# Patient Record
Sex: MALE | URBAN - METROPOLITAN AREA
[De-identification: names, ages, dates, MRNs, and addresses within clinical notes are randomized per-mention and may not be internally consistent; named-entity substitution may affect disease eponyms.]

---

## 2021-04-01 ENCOUNTER — NURSE ONLY (OUTPATIENT)
Dept: PRIMARY CARE CLINIC | Age: 31
End: 2021-04-01

## 2021-04-01 DIAGNOSIS — Z23 HIGH PRIORITY FOR COVID-19 VIRUS VACCINATION: Primary | ICD-10-CM

## 2025-01-30 ENCOUNTER — HOSPITAL ENCOUNTER (EMERGENCY)
Age: 35
Discharge: HOME | End: 2025-01-30
Attending: STUDENT IN AN ORGANIZED HEALTH CARE EDUCATION/TRAINING PROGRAM
Payer: COMMERCIAL

## 2025-01-30 VITALS
BODY MASS INDEX: 28.03 KG/M2 | HEART RATE: 92 BPM | DIASTOLIC BLOOD PRESSURE: 82 MMHG | RESPIRATION RATE: 14 BRPM | HEIGHT: 75 IN | OXYGEN SATURATION: 99 % | TEMPERATURE: 97.9 F | SYSTOLIC BLOOD PRESSURE: 139 MMHG | WEIGHT: 225.4 LBS

## 2025-01-30 DIAGNOSIS — S61.412A LACERATION OF LEFT HAND WITHOUT FOREIGN BODY, INITIAL ENCOUNTER: Primary | ICD-10-CM

## 2025-01-30 DIAGNOSIS — S60.311A ABRASION OF RIGHT THUMB, INITIAL ENCOUNTER: ICD-10-CM

## 2025-01-30 PROCEDURE — 90471 IMMUNIZATION ADMIN: CPT | Performed by: STUDENT IN AN ORGANIZED HEALTH CARE EDUCATION/TRAINING PROGRAM

## 2025-01-30 PROCEDURE — 99284 EMERGENCY DEPT VISIT MOD MDM: CPT

## 2025-01-30 PROCEDURE — 90715 TDAP VACCINE 7 YRS/> IM: CPT | Performed by: STUDENT IN AN ORGANIZED HEALTH CARE EDUCATION/TRAINING PROGRAM

## 2025-01-30 PROCEDURE — 12002 RPR S/N/AX/GEN/TRNK2.6-7.5CM: CPT

## 2025-01-30 PROCEDURE — 6360000002 HC RX W HCPCS: Performed by: STUDENT IN AN ORGANIZED HEALTH CARE EDUCATION/TRAINING PROGRAM

## 2025-01-30 RX ORDER — LIDOCAINE HYDROCHLORIDE AND EPINEPHRINE 10; 10 MG/ML; UG/ML
20 INJECTION, SOLUTION INFILTRATION; PERINEURAL ONCE
Status: COMPLETED | OUTPATIENT
Start: 2025-01-30 | End: 2025-01-30

## 2025-01-30 RX ADMIN — TETANUS TOXOID, REDUCED DIPHTHERIA TOXOID AND ACELLULAR PERTUSSIS VACCINE, ADSORBED 0.5 ML: 5; 2.5; 8; 8; 2.5 SUSPENSION INTRAMUSCULAR at 21:39

## 2025-01-30 RX ADMIN — LIDOCAINE HYDROCHLORIDE,EPINEPHRINE BITARTRATE 20 ML: 10; .01 INJECTION, SOLUTION INFILTRATION; PERINEURAL at 21:15

## 2025-01-31 NOTE — ED PROVIDER NOTES
EMERGENCY DEPARTMENT PROVIDER NOTE         PATIENT IDENTIFICATION     Name:   Edd Mclain  MRN:   [id number]  YOB: 1990  Date of Evaluation:   1/30/2025  Provider:   Yaya Lara DO  PCP:   No primary care provider on file.        CHIEF COMPLAINT       Laceration        HISTORY OF PRESENT ILLNESS     Edd Mclain is a(n) 34 y.o. male with no stated past medical history who arrives via private vehicle for laceration to the bilateral hands.  States that about 20 minutes prior to arrival he was cut by the handle of a glass mug that has a sharp edge.  Denies that the glass mug shattered or any concern for foreign bodies.  Currently has a very superficial abrasion to the right thumb as well as a laceration to the left thumb.  Denies any other complaints.    I personally reviewed the following nurse documentation:  History reviewed. No pertinent past medical history.  Prior to Admission medications    Not on File     No Known Allergies   History reviewed. No pertinent surgical history.  Social History     Socioeconomic History    Marital status: Single     Spouse name: None    Number of children: None    Years of education: None    Highest education level: None      History reviewed. No pertinent family history.      REVIEW OF SYSTEMS     A complete 11 system review of systems was performed (constitutional, eyes, ENMT, cardiovascular, respiratory, gastrointestinal, genitourinary, skin, neurological, and psychiatric) and was negative aside from the pertinent positives and negatives noted in the HPI.        PHYSICAL EXAM     ED Triage Vitals [01/30/25 2023]   BP Systolic BP Percentile Diastolic BP Percentile Temp Temp Source Pulse Respirations SpO2   139/82 -- -- 97.9 °F (36.6 °C) Oral 92 14 99 %      Height Weight - Scale         1.905 m (6' 3\") 102.2 kg (225 lb 6.4 oz)           Physical Exam  Vitals and nursing note reviewed.   Constitutional:       General: He is not in acute distress.

## 2025-01-31 NOTE — DISCHARGE INSTRUCTIONS
Please return to the emergency department or your primary care provider in 7-10 days for sutures removal.  You may wash but do not scrub the area for at least 24 hours after placement  Return IMMEDIATELY for any significant redness, discharge, or if the wound opens once again.    It is mandatory that you follow up with your primary care provider to ensure resolution/improvement of your symptoms.    MANDATORY return to the emergency department within 12-24 hours unless you are better.  If you feel worse or have any other concerns, return sooner.    If you experience any of the red flag signs/symptoms that we discussed, please return to the emergency department or call 911 immediately.    Please take medications as we discussed.

## 2025-02-07 ENCOUNTER — HOSPITAL ENCOUNTER (EMERGENCY)
Age: 35
Discharge: HOME | End: 2025-02-07
Payer: COMMERCIAL

## 2025-02-07 PROCEDURE — 99281 EMR DPT VST MAYX REQ PHY/QHP: CPT

## 2025-02-07 NOTE — ED NOTES
Patient presents for suture removal. The wound is well healed without signs of infection.  3 sutures were removed from L hand. Wound care and activity instructions given.